# Patient Record
Sex: MALE | Race: WHITE | Employment: UNEMPLOYED | URBAN - METROPOLITAN AREA
[De-identification: names, ages, dates, MRNs, and addresses within clinical notes are randomized per-mention and may not be internally consistent; named-entity substitution may affect disease eponyms.]

---

## 2019-01-01 ENCOUNTER — HOSPITAL ENCOUNTER (EMERGENCY)
Age: 69
End: 2019-06-29
Attending: EMERGENCY MEDICINE
Payer: MEDICARE

## 2019-01-01 VITALS — WEIGHT: 160 LBS

## 2019-01-01 DIAGNOSIS — I46.9 CARDIAC ARREST (HCC): Primary | ICD-10-CM

## 2019-01-01 PROCEDURE — 2500000003 HC RX 250 WO HCPCS

## 2019-01-01 PROCEDURE — 99285 EMERGENCY DEPT VISIT HI MDM: CPT

## 2019-01-01 PROCEDURE — 92950 HEART/LUNG RESUSCITATION CPR: CPT

## 2019-01-01 PROCEDURE — 93005 ELECTROCARDIOGRAM TRACING: CPT | Performed by: EMERGENCY MEDICINE

## 2019-01-01 PROCEDURE — 2580000003 HC RX 258

## 2019-01-01 PROCEDURE — 6360000002 HC RX W HCPCS

## 2019-01-01 PROCEDURE — 93010 ELECTROCARDIOGRAM REPORT: CPT | Performed by: INTERNAL MEDICINE

## 2019-06-29 NOTE — ED PROVIDER NOTES
Patient is a 70-year-old male the presents to the emergency department in cardiac arrest.  Patient was backing out of his driveway when he suddenly went unresponsive. Another  noticed and called 911. EMS arrived on the scene the patient did not have a pulse. CPR was initiated at that time. Patient was given 2 rounds of epinephrine and CPR was performed for 15 minutes prior to arrival.  EMS does not have any further medical history and no family was present at this time. The history is provided by the EMS personnel. Review of Systems   Unable to perform ROS: Patient unresponsive       Physical Exam   Constitutional: He appears well-developed and well-nourished. HENT:   Head: Normocephalic and atraumatic. Mouth/Throat: Oropharynx is clear and moist.   Leo airway in place on arrival.   Eyes: Right eye exhibits no discharge. Left eye exhibits no discharge. No scleral icterus. Pupils fixed, 3 mm. Neck: No JVD present. No tracheal deviation present. Cardiovascular:   PEA on arrival   Pulmonary/Chest:   Agonal respirations. Abdominal: He exhibits distension. Musculoskeletal: He exhibits no edema or deformity. Neurological: He is unresponsive. Skin: Skin is warm and dry. There is pallor. Nursing note and vitals reviewed. Procedures    MDM  Number of Diagnoses or Management Options  Cardiac arrest [facility]):   Diagnosis management comments: Patient is a 70-year-old male that presents to the emergency department for evaluation and cardiac arrest. Pulse check was performed with no palpation of pulses. 1 round of epinephrine was given and CPR was resumed. Leo airway was exchanged for an endotracheal tube at this time. Bicarb was given. Pulses were regained briefly via Doppler. EKG was performed which showed a wide-complex rhythm with right bundle branch block, possible myocardial infarction. Patient was pulseless again and CPR was resumed.   Several more rounds of epinephrine were

## 2019-06-30 LAB
EKG ATRIAL RATE: 63 BPM
EKG Q-T INTERVAL: 550 MS
EKG QRS DURATION: 212 MS
EKG QTC CALCULATION (BAZETT): 501 MS
EKG R AXIS: 89 DEGREES
EKG T AXIS: 59 DEGREES
EKG VENTRICULAR RATE: 50 BPM